# Patient Record
Sex: FEMALE | Race: BLACK OR AFRICAN AMERICAN | NOT HISPANIC OR LATINO | URBAN - METROPOLITAN AREA
[De-identification: names, ages, dates, MRNs, and addresses within clinical notes are randomized per-mention and may not be internally consistent; named-entity substitution may affect disease eponyms.]

---

## 2017-06-17 ENCOUNTER — EMERGENCY (EMERGENCY)
Facility: HOSPITAL | Age: 57
LOS: 1 days | Discharge: HOME | End: 2017-06-17
Attending: EMERGENCY MEDICINE | Admitting: EMERGENCY MEDICINE
Payer: MEDICARE

## 2017-06-17 VITALS
DIASTOLIC BLOOD PRESSURE: 91 MMHG | TEMPERATURE: 98 F | RESPIRATION RATE: 17 BRPM | HEART RATE: 61 BPM | SYSTOLIC BLOOD PRESSURE: 130 MMHG | OXYGEN SATURATION: 100 %

## 2017-06-17 VITALS
DIASTOLIC BLOOD PRESSURE: 81 MMHG | RESPIRATION RATE: 19 BRPM | HEART RATE: 71 BPM | TEMPERATURE: 98 F | SYSTOLIC BLOOD PRESSURE: 153 MMHG | OXYGEN SATURATION: 100 %

## 2017-06-17 DIAGNOSIS — Z98.89 OTHER SPECIFIED POSTPROCEDURAL STATES: Chronic | ICD-10-CM

## 2017-06-17 DIAGNOSIS — Z90.710 ACQUIRED ABSENCE OF BOTH CERVIX AND UTERUS: Chronic | ICD-10-CM

## 2017-06-17 DIAGNOSIS — N20.1 CALCULUS OF URETER: Chronic | ICD-10-CM

## 2017-06-17 DIAGNOSIS — Z98.51 TUBAL LIGATION STATUS: Chronic | ICD-10-CM

## 2017-06-17 DIAGNOSIS — E65 LOCALIZED ADIPOSITY: Chronic | ICD-10-CM

## 2017-06-17 LAB
ALBUMIN SERPL ELPH-MCNC: 4.4 G/DL — SIGNIFICANT CHANGE UP (ref 3.3–5)
ALP SERPL-CCNC: 91 U/L — SIGNIFICANT CHANGE UP (ref 40–120)
ALT FLD-CCNC: 14 U/L — SIGNIFICANT CHANGE UP (ref 4–33)
APPEARANCE UR: SIGNIFICANT CHANGE UP
AST SERPL-CCNC: 15 U/L — SIGNIFICANT CHANGE UP (ref 4–32)
BASE EXCESS BLDV CALC-SCNC: -1.1 MMOL/L — SIGNIFICANT CHANGE UP
BASOPHILS # BLD AUTO: 0.01 K/UL — SIGNIFICANT CHANGE UP (ref 0–0.2)
BASOPHILS NFR BLD AUTO: 0.2 % — SIGNIFICANT CHANGE UP (ref 0–2)
BILIRUB SERPL-MCNC: 0.6 MG/DL — SIGNIFICANT CHANGE UP (ref 0.2–1.2)
BILIRUB UR-MCNC: NEGATIVE — SIGNIFICANT CHANGE UP
BLOOD GAS VENOUS - CREATININE: 1.01 MG/DL — SIGNIFICANT CHANGE UP (ref 0.5–1.3)
BLOOD UR QL VISUAL: HIGH
BUN SERPL-MCNC: 15 MG/DL — SIGNIFICANT CHANGE UP (ref 7–23)
CALCIUM SERPL-MCNC: 8.7 MG/DL — SIGNIFICANT CHANGE UP (ref 8.4–10.5)
CHLORIDE BLDV-SCNC: 114 MMOL/L — HIGH (ref 96–108)
CHLORIDE SERPL-SCNC: 105 MMOL/L — SIGNIFICANT CHANGE UP (ref 98–107)
CO2 SERPL-SCNC: 21 MMOL/L — LOW (ref 22–31)
COLOR SPEC: SIGNIFICANT CHANGE UP
CREAT SERPL-MCNC: 1 MG/DL — SIGNIFICANT CHANGE UP (ref 0.5–1.3)
EOSINOPHIL # BLD AUTO: 0.05 K/UL — SIGNIFICANT CHANGE UP (ref 0–0.5)
EOSINOPHIL NFR BLD AUTO: 0.8 % — SIGNIFICANT CHANGE UP (ref 0–6)
GAS PNL BLDV: 138 MMOL/L — SIGNIFICANT CHANGE UP (ref 136–146)
GLUCOSE BLDV-MCNC: 87 — SIGNIFICANT CHANGE UP (ref 70–99)
GLUCOSE SERPL-MCNC: 86 MG/DL — SIGNIFICANT CHANGE UP (ref 70–99)
GLUCOSE UR-MCNC: NEGATIVE — SIGNIFICANT CHANGE UP
HCO3 BLDV-SCNC: 22 MMOL/L — SIGNIFICANT CHANGE UP (ref 20–27)
HCT VFR BLD CALC: 39.3 % — SIGNIFICANT CHANGE UP (ref 34.5–45)
HCT VFR BLDV CALC: 50 % — HIGH (ref 34.5–45)
HGB BLD-MCNC: 12.5 G/DL — SIGNIFICANT CHANGE UP (ref 11.5–15.5)
HGB BLDV-MCNC: 16.3 G/DL — HIGH (ref 11.5–15.5)
IMM GRANULOCYTES NFR BLD AUTO: 0.3 % — SIGNIFICANT CHANGE UP (ref 0–1.5)
KETONES UR-MCNC: NEGATIVE — SIGNIFICANT CHANGE UP
LACTATE BLDV-MCNC: 2 MMOL/L — SIGNIFICANT CHANGE UP (ref 0.5–2)
LEUKOCYTE ESTERASE UR-ACNC: NEGATIVE — SIGNIFICANT CHANGE UP
LIDOCAIN IGE QN: 27.9 U/L — SIGNIFICANT CHANGE UP (ref 7–60)
LYMPHOCYTES # BLD AUTO: 2.65 K/UL — SIGNIFICANT CHANGE UP (ref 1–3.3)
LYMPHOCYTES # BLD AUTO: 40.6 % — SIGNIFICANT CHANGE UP (ref 13–44)
MCHC RBC-ENTMCNC: 28.1 PG — SIGNIFICANT CHANGE UP (ref 27–34)
MCHC RBC-ENTMCNC: 31.8 % — LOW (ref 32–36)
MCV RBC AUTO: 88.3 FL — SIGNIFICANT CHANGE UP (ref 80–100)
MONOCYTES # BLD AUTO: 0.61 K/UL — SIGNIFICANT CHANGE UP (ref 0–0.9)
MONOCYTES NFR BLD AUTO: 9.4 % — SIGNIFICANT CHANGE UP (ref 2–14)
MUCOUS THREADS # UR AUTO: SIGNIFICANT CHANGE UP
NEUTROPHILS # BLD AUTO: 3.18 K/UL — SIGNIFICANT CHANGE UP (ref 1.8–7.4)
NEUTROPHILS NFR BLD AUTO: 48.7 % — SIGNIFICANT CHANGE UP (ref 43–77)
NITRITE UR-MCNC: NEGATIVE — SIGNIFICANT CHANGE UP
PCO2 BLDV: 44 MMHG — SIGNIFICANT CHANGE UP (ref 41–51)
PH BLDV: 7.35 PH — SIGNIFICANT CHANGE UP (ref 7.32–7.43)
PH UR: 6 — SIGNIFICANT CHANGE UP (ref 4.6–8)
PLATELET # BLD AUTO: 290 K/UL — SIGNIFICANT CHANGE UP (ref 150–400)
PMV BLD: 11.4 FL — SIGNIFICANT CHANGE UP (ref 7–13)
PO2 BLDV: 28 MMHG — LOW (ref 35–40)
POTASSIUM BLDV-SCNC: 3.7 MMOL/L — SIGNIFICANT CHANGE UP (ref 3.4–4.5)
POTASSIUM SERPL-MCNC: 4 MMOL/L — SIGNIFICANT CHANGE UP (ref 3.5–5.3)
POTASSIUM SERPL-SCNC: 4 MMOL/L — SIGNIFICANT CHANGE UP (ref 3.5–5.3)
PROT SERPL-MCNC: 7.5 G/DL — SIGNIFICANT CHANGE UP (ref 6–8.3)
PROT UR-MCNC: 100 — HIGH
RBC # BLD: 4.45 M/UL — SIGNIFICANT CHANGE UP (ref 3.8–5.2)
RBC # FLD: 13.6 % — SIGNIFICANT CHANGE UP (ref 10.3–14.5)
RBC CASTS # UR COMP ASSIST: >50 — HIGH (ref 0–?)
SAO2 % BLDV: 44.6 % — LOW (ref 60–85)
SODIUM SERPL-SCNC: 142 MMOL/L — SIGNIFICANT CHANGE UP (ref 135–145)
SP GR SPEC: 1.02 — SIGNIFICANT CHANGE UP (ref 1–1.03)
SQUAMOUS # UR AUTO: SIGNIFICANT CHANGE UP
UROBILINOGEN FLD QL: NORMAL E.U. — SIGNIFICANT CHANGE UP (ref 0.1–0.2)
WBC # BLD: 6.52 K/UL — SIGNIFICANT CHANGE UP (ref 3.8–10.5)
WBC # FLD AUTO: 6.52 K/UL — SIGNIFICANT CHANGE UP (ref 3.8–10.5)
WBC UR QL: SIGNIFICANT CHANGE UP (ref 0–?)

## 2017-06-17 PROCEDURE — 74176 CT ABD & PELVIS W/O CONTRAST: CPT | Mod: 26

## 2017-06-17 PROCEDURE — 99284 EMERGENCY DEPT VISIT MOD MDM: CPT | Mod: GC

## 2017-06-17 PROCEDURE — 76770 US EXAM ABDO BACK WALL COMP: CPT | Mod: 26

## 2017-06-17 RX ORDER — TRAMADOL HYDROCHLORIDE 50 MG/1
50 TABLET ORAL ONCE
Qty: 0 | Refills: 0 | Status: DISCONTINUED | OUTPATIENT
Start: 2017-06-17 | End: 2017-06-17

## 2017-06-17 RX ORDER — TRAMADOL HYDROCHLORIDE 50 MG/1
1 TABLET ORAL
Qty: 5 | Refills: 0 | OUTPATIENT
Start: 2017-06-17

## 2017-06-17 RX ORDER — ACETAMINOPHEN 500 MG
650 TABLET ORAL ONCE
Qty: 0 | Refills: 0 | Status: COMPLETED | OUTPATIENT
Start: 2017-06-17 | End: 2017-06-17

## 2017-06-17 RX ORDER — KETOROLAC TROMETHAMINE 30 MG/ML
30 SYRINGE (ML) INJECTION ONCE
Qty: 0 | Refills: 0 | Status: DISCONTINUED | OUTPATIENT
Start: 2017-06-17 | End: 2017-06-17

## 2017-06-17 RX ORDER — SODIUM CHLORIDE 9 MG/ML
2000 INJECTION INTRAMUSCULAR; INTRAVENOUS; SUBCUTANEOUS ONCE
Qty: 0 | Refills: 0 | Status: COMPLETED | OUTPATIENT
Start: 2017-06-17 | End: 2017-06-17

## 2017-06-17 RX ADMIN — Medication 30 MILLIGRAM(S): at 14:41

## 2017-06-17 RX ADMIN — SODIUM CHLORIDE 1333.33 MILLILITER(S): 9 INJECTION INTRAMUSCULAR; INTRAVENOUS; SUBCUTANEOUS at 14:42

## 2017-06-17 RX ADMIN — TRAMADOL HYDROCHLORIDE 50 MILLIGRAM(S): 50 TABLET ORAL at 21:23

## 2017-06-17 RX ADMIN — Medication 30 MILLIGRAM(S): at 14:50

## 2017-06-17 RX ADMIN — Medication 650 MILLIGRAM(S): at 17:46

## 2017-06-17 NOTE — ED PROVIDER NOTE - PROGRESS NOTE DETAILS
LORA note: 57 y/o F w/ hx renal stone pw s/s consistent w/ recurrent renal stone, will pain control and us/UA. pt feels better, will follow up with urology;

## 2017-06-17 NOTE — ED ADULT NURSE NOTE - CHIEF COMPLAINT QUOTE
Co left flank pain since last night. States pain subsided but started again today. Co nausea, vomiting and loose stools. Hx of lithotripsy.

## 2017-06-17 NOTE — ED PROVIDER NOTE - PMH
Calculus of ureter    GERD (gastroesophageal reflux disease)    Horseshoe kidney  left  Hypertension    MVP (mitral valve prolapse)    Obesity (BMI 30-39.9)    Psoriatic arthritis    Delacruz syndrome    Sleep apnea, obstructive  not on CPAP

## 2017-06-17 NOTE — ED PROVIDER NOTE - ATTENDING CONTRIBUTION TO CARE
Attending Attestation: Dr. Ramirez  I have personally performed a history and physical examination of the patient and discussed management with the resident as well as the patient.  I reviewed the resident's note and agree with the documented findings and plan of care.  I have authored and modified critical sections of the Provider Note, including but not limited to HPI, Physical Exam and MDM. 57 y/o female hx nephrolithiasis c/o left flank pain, consider renal colic.  Will US, pain control and reassess.

## 2017-06-17 NOTE — ED PROVIDER NOTE - PSH
Abdominal obesity  s/p gastric sleeve 2011  Calculus of left ureter  s/p stent  History of arthroscopy of left knee    S/P hysterectomy  2014  S/P tubal ligation    Status post medial meniscus repair  left knee

## 2017-06-17 NOTE — ED PROVIDER NOTE - MEDICAL DECISION MAKING DETAILS
57 y/o femlae hx nephrolithiasis c/o left flank pain  -probable renal colic  -pain control/us renal 57 y/o female hx nephrolithiasis c/o left flank pain, consider renal colic.  Will US, pain control and reassess.

## 2017-06-17 NOTE — ED PROVIDER NOTE - OBJECTIVE STATEMENT
55 y/o female hx HTN RA Psoriatic arthritis, kidney stones (s/p renal stent and removal 2015) presents to ED c/o left flank pain x 2 days. Pt. states last night developed 5/10 left groind/flank pain  that was intermittent in nature and eventually resolved after motrin - states this am developed worsneing 9/10 left sided groin pain radiating to flank and lower back that is similar to previoous renal colic in the past. aslo admits to urinary urgerncy and slight dysuria as well. + nausea last nigth no vomit. gaurav fever chills weakenss dizziness blood in urine. 55 y/o female hx HTN RA Psoriatic arthritis, kidney stones (s/p renal stent and removal 2015) presents to ED c/o left flank pain x 2 days. Pt. states last night developed 5/10 left groin/flank pain  that was intermittent in nature and eventually resolved after motrin - states this am developed worsening 9/10 left sided groin pain radiating to flank and lower back that is similar to previous renal colic in the past. also admits to urinary urgency and slight dysuria as well. + nausea last nigth no vomit. gaurav fever chills weakenss dizziness blood in urine.

## 2017-06-17 NOTE — ED ADULT NURSE NOTE - OBJECTIVE STATEMENT
Alert and oriented x 4. Pt received to spot 6 complaining of left flank pain 6/10. Pt states " Pain started last night , I had kidney stones before. " Pt denies chest pain, shortness of breath, diarrhea or painful urination. Pt is nauseous and did vomit last night. Iv 20g to left AC> Labs drawn.  VSS. Will continue to monitor.

## 2017-06-19 LAB
BACTERIA UR CULT: SIGNIFICANT CHANGE UP
SPECIMEN SOURCE: SIGNIFICANT CHANGE UP

## 2018-06-01 ENCOUNTER — OUTPATIENT (OUTPATIENT)
Dept: OUTPATIENT SERVICES | Facility: HOSPITAL | Age: 58
LOS: 1 days | End: 2018-06-01
Payer: MEDICAID

## 2018-06-01 DIAGNOSIS — N20.1 CALCULUS OF URETER: Chronic | ICD-10-CM

## 2018-06-01 DIAGNOSIS — Z98.51 TUBAL LIGATION STATUS: Chronic | ICD-10-CM

## 2018-06-01 DIAGNOSIS — E65 LOCALIZED ADIPOSITY: Chronic | ICD-10-CM

## 2018-06-01 DIAGNOSIS — Z98.89 OTHER SPECIFIED POSTPROCEDURAL STATES: Chronic | ICD-10-CM

## 2018-06-01 DIAGNOSIS — Z90.710 ACQUIRED ABSENCE OF BOTH CERVIX AND UTERUS: Chronic | ICD-10-CM

## 2018-06-01 PROCEDURE — G9005: CPT

## 2018-06-15 ENCOUNTER — EMERGENCY (EMERGENCY)
Facility: HOSPITAL | Age: 58
LOS: 1 days | Discharge: HOME | End: 2018-06-15
Attending: EMERGENCY MEDICINE | Admitting: EMERGENCY MEDICINE
Payer: MEDICARE

## 2018-06-15 VITALS
OXYGEN SATURATION: 100 % | RESPIRATION RATE: 14 BRPM | DIASTOLIC BLOOD PRESSURE: 78 MMHG | SYSTOLIC BLOOD PRESSURE: 114 MMHG | HEART RATE: 67 BPM | TEMPERATURE: 98 F

## 2018-06-15 DIAGNOSIS — Z98.89 OTHER SPECIFIED POSTPROCEDURAL STATES: Chronic | ICD-10-CM

## 2018-06-15 DIAGNOSIS — E65 LOCALIZED ADIPOSITY: Chronic | ICD-10-CM

## 2018-06-15 DIAGNOSIS — Z90.710 ACQUIRED ABSENCE OF BOTH CERVIX AND UTERUS: Chronic | ICD-10-CM

## 2018-06-15 DIAGNOSIS — N20.1 CALCULUS OF URETER: Chronic | ICD-10-CM

## 2018-06-15 DIAGNOSIS — Z98.51 TUBAL LIGATION STATUS: Chronic | ICD-10-CM

## 2018-06-15 LAB
ALBUMIN SERPL ELPH-MCNC: 4.5 G/DL — SIGNIFICANT CHANGE UP (ref 3.3–5)
ALP SERPL-CCNC: 93 U/L — SIGNIFICANT CHANGE UP (ref 40–120)
ALT FLD-CCNC: 15 U/L — SIGNIFICANT CHANGE UP (ref 4–33)
AST SERPL-CCNC: 17 U/L — SIGNIFICANT CHANGE UP (ref 4–32)
BASE EXCESS BLDV CALC-SCNC: 0.6 MMOL/L — SIGNIFICANT CHANGE UP
BASOPHILS # BLD AUTO: 0.02 K/UL — SIGNIFICANT CHANGE UP (ref 0–0.2)
BASOPHILS NFR BLD AUTO: 0.3 % — SIGNIFICANT CHANGE UP (ref 0–2)
BILIRUB SERPL-MCNC: 0.7 MG/DL — SIGNIFICANT CHANGE UP (ref 0.2–1.2)
BLOOD GAS VENOUS - CREATININE: 0.8 MG/DL — SIGNIFICANT CHANGE UP (ref 0.5–1.3)
BUN SERPL-MCNC: 16 MG/DL — SIGNIFICANT CHANGE UP (ref 7–23)
CALCIUM SERPL-MCNC: 9.4 MG/DL — SIGNIFICANT CHANGE UP (ref 8.4–10.5)
CHLORIDE BLDV-SCNC: 113 MMOL/L — HIGH (ref 96–108)
CHLORIDE SERPL-SCNC: 103 MMOL/L — SIGNIFICANT CHANGE UP (ref 98–107)
CO2 SERPL-SCNC: 25 MMOL/L — SIGNIFICANT CHANGE UP (ref 22–31)
CREAT SERPL-MCNC: 0.9 MG/DL — SIGNIFICANT CHANGE UP (ref 0.5–1.3)
EOSINOPHIL # BLD AUTO: 0.13 K/UL — SIGNIFICANT CHANGE UP (ref 0–0.5)
EOSINOPHIL NFR BLD AUTO: 2.1 % — SIGNIFICANT CHANGE UP (ref 0–6)
GAS PNL BLDV: 137 MMOL/L — SIGNIFICANT CHANGE UP (ref 136–146)
GLUCOSE BLDV-MCNC: 86 — SIGNIFICANT CHANGE UP (ref 70–99)
GLUCOSE SERPL-MCNC: 84 MG/DL — SIGNIFICANT CHANGE UP (ref 70–99)
HCO3 BLDV-SCNC: 23 MMOL/L — SIGNIFICANT CHANGE UP (ref 20–27)
HCT VFR BLD CALC: 40.1 % — SIGNIFICANT CHANGE UP (ref 34.5–45)
HCT VFR BLDV CALC: 41.9 % — SIGNIFICANT CHANGE UP (ref 34.5–45)
HGB BLD-MCNC: 12.8 G/DL — SIGNIFICANT CHANGE UP (ref 11.5–15.5)
HGB BLDV-MCNC: 13.6 G/DL — SIGNIFICANT CHANGE UP (ref 11.5–15.5)
IMM GRANULOCYTES # BLD AUTO: 0.02 # — SIGNIFICANT CHANGE UP
IMM GRANULOCYTES NFR BLD AUTO: 0.3 % — SIGNIFICANT CHANGE UP (ref 0–1.5)
LACTATE BLDV-MCNC: 1.4 MMOL/L — SIGNIFICANT CHANGE UP (ref 0.5–2)
LYMPHOCYTES # BLD AUTO: 2.49 K/UL — SIGNIFICANT CHANGE UP (ref 1–3.3)
LYMPHOCYTES # BLD AUTO: 40.2 % — SIGNIFICANT CHANGE UP (ref 13–44)
MCHC RBC-ENTMCNC: 27.7 PG — SIGNIFICANT CHANGE UP (ref 27–34)
MCHC RBC-ENTMCNC: 31.9 % — LOW (ref 32–36)
MCV RBC AUTO: 86.8 FL — SIGNIFICANT CHANGE UP (ref 80–100)
MONOCYTES # BLD AUTO: 0.81 K/UL — SIGNIFICANT CHANGE UP (ref 0–0.9)
MONOCYTES NFR BLD AUTO: 13.1 % — SIGNIFICANT CHANGE UP (ref 2–14)
NEUTROPHILS # BLD AUTO: 2.72 K/UL — SIGNIFICANT CHANGE UP (ref 1.8–7.4)
NEUTROPHILS NFR BLD AUTO: 44 % — SIGNIFICANT CHANGE UP (ref 43–77)
NRBC # FLD: 0 — SIGNIFICANT CHANGE UP
PCO2 BLDV: 53 MMHG — HIGH (ref 41–51)
PH BLDV: 7.31 PH — LOW (ref 7.32–7.43)
PLATELET # BLD AUTO: 299 K/UL — SIGNIFICANT CHANGE UP (ref 150–400)
PMV BLD: 10.7 FL — SIGNIFICANT CHANGE UP (ref 7–13)
PO2 BLDV: < 24 MMHG — LOW (ref 35–40)
POTASSIUM BLDV-SCNC: 4.2 MMOL/L — SIGNIFICANT CHANGE UP (ref 3.4–4.5)
POTASSIUM SERPL-MCNC: 4.4 MMOL/L — SIGNIFICANT CHANGE UP (ref 3.5–5.3)
POTASSIUM SERPL-SCNC: 4.4 MMOL/L — SIGNIFICANT CHANGE UP (ref 3.5–5.3)
PROT SERPL-MCNC: 7.8 G/DL — SIGNIFICANT CHANGE UP (ref 6–8.3)
RBC # BLD: 4.62 M/UL — SIGNIFICANT CHANGE UP (ref 3.8–5.2)
RBC # FLD: 13.8 % — SIGNIFICANT CHANGE UP (ref 10.3–14.5)
SAO2 % BLDV: 29.5 % — LOW (ref 60–85)
SODIUM SERPL-SCNC: 141 MMOL/L — SIGNIFICANT CHANGE UP (ref 135–145)
WBC # BLD: 6.19 K/UL — SIGNIFICANT CHANGE UP (ref 3.8–10.5)
WBC # FLD AUTO: 6.19 K/UL — SIGNIFICANT CHANGE UP (ref 3.8–10.5)

## 2018-06-15 PROCEDURE — 99219: CPT

## 2018-06-15 RX ORDER — DIPHENHYDRAMINE HCL 50 MG
25 CAPSULE ORAL ONCE
Qty: 0 | Refills: 0 | Status: COMPLETED | OUTPATIENT
Start: 2018-06-15 | End: 2018-06-15

## 2018-06-15 RX ORDER — IBUPROFEN 200 MG
600 TABLET ORAL EVERY 6 HOURS
Qty: 0 | Refills: 0 | Status: DISCONTINUED | OUTPATIENT
Start: 2018-06-15 | End: 2018-06-19

## 2018-06-15 RX ORDER — IBUPROFEN 200 MG
600 TABLET ORAL ONCE
Qty: 0 | Refills: 0 | Status: COMPLETED | OUTPATIENT
Start: 2018-06-15 | End: 2018-06-15

## 2018-06-15 RX ORDER — ACETAMINOPHEN 500 MG
650 TABLET ORAL ONCE
Qty: 0 | Refills: 0 | Status: COMPLETED | OUTPATIENT
Start: 2018-06-15 | End: 2018-06-15

## 2018-06-15 RX ADMIN — Medication 100 MILLIGRAM(S): at 08:38

## 2018-06-15 RX ADMIN — Medication 25 MILLIGRAM(S): at 18:00

## 2018-06-15 RX ADMIN — Medication 600 MILLIGRAM(S): at 18:30

## 2018-06-15 RX ADMIN — Medication 650 MILLIGRAM(S): at 12:02

## 2018-06-15 RX ADMIN — Medication 650 MILLIGRAM(S): at 13:02

## 2018-06-15 RX ADMIN — Medication 100 MILLIGRAM(S): at 22:02

## 2018-06-15 RX ADMIN — Medication 600 MILLIGRAM(S): at 18:00

## 2018-06-15 RX ADMIN — Medication 100 MILLIGRAM(S): at 14:21

## 2018-06-15 NOTE — ED CDU PROVIDER INITIAL DAY NOTE - MEDICAL DECISION MAKING DETAILS
57 y.o female pmhx of ?eczema vs psoriatic arthritis here with R sided facial cellulitis- IV abx, area demarcated will continue to monitor.

## 2018-06-15 NOTE — ED PROVIDER NOTE - OBJECTIVE STATEMENT
58 yo woman w/ h/o asthma, psoriatic arthritis (no immune modulators) here w/ facial swelling. Developed redness and swelling of R face/cheek several days ago after sustaining a small scratch to area. States this is different that normal lesions she has w/ psoriatic arthritis. Otherwise, denies fever, visual changes, eye pain, dental pain.

## 2018-06-15 NOTE — ED PROVIDER NOTE - MEDICAL DECISION MAKING DETAILS
58 yo woman w/ redness, swelling, and crusting of R face. Likely cellulitis. The plan is to check labs, antibiotics.

## 2018-06-15 NOTE — ED ADULT NURSE NOTE - OBJECTIVE STATEMENT
Pt presented to ED with c/o pain and swelling to right face x 5 days, crusting noted to area. pt also reported pain to joints. denies trauma to face.

## 2018-06-15 NOTE — ED ADULT TRIAGE NOTE - CHIEF COMPLAINT QUOTE
Pt with h/o psoriatic arthritis c/o right facial lesion x 5 days. States it is improving, but presents to ED for eval because it is unlike usual lesions she has had in the past. Right side of face is red and swollen. Also c/o joint pain. PMH Raynaud's Syndrome, migraines, kidney stones with urethral stent, MVP, asthma

## 2018-06-15 NOTE — ED PROVIDER NOTE - PROGRESS NOTE DETAILS
Spencer Amador MD PGY4: Labs reviewed. Received ABX in ED. Case d/w CDU and accepted for observation.

## 2018-06-15 NOTE — ED CDU PROVIDER INITIAL DAY NOTE - PROGRESS NOTE DETAILS
This patient was signed out to me by CDU day EDITH Contreras and attending Dr. Eldridge at 1900 hrs.; test results and orders reviewed.  In the interim, pt has been resting comfortably; no c/o or issues thus far.  Pt. stable at present.  Will continue to monitor.

## 2018-06-15 NOTE — ED CDU PROVIDER INITIAL DAY NOTE - PHYSICAL EXAMINATION
Scattered old scarred lesions on R forearm  R eyelid swelling  EOM intact, no conjunctival injection  PERRL  R side facial erythema over R cheek ( area demarcated)   center crusted lesion ~ 2 x 2 inch with no active drainage  preauricular node swelling to R side   no neck swelling or tenderness  uvula midline, tolerating airway Scattered old scarred lesions on R forearm  R eyelid swelling  EOM intact, no conjunctival injection  PERRL  R side facial erythema over R cheek ( area demarcated)   center crusted lesion ~ 2 x 2 cm with no active drainage  preauricular node swelling to R side   no neck swelling or tenderness  uvula midline, tolerating airway

## 2018-06-15 NOTE — ED CDU PROVIDER INITIAL DAY NOTE - FAMILY HISTORY
Mother  Still living? No  Family history of diabetes mellitus in mother, Age at diagnosis: Age Unknown     Father  Still living? No  Family history of hypertension in father, Age at diagnosis: Age Unknown

## 2018-06-15 NOTE — ED CDU PROVIDER INITIAL DAY NOTE - ATTENDING CONTRIBUTION TO CARE
CDU MD Post:  I performed a face to face bedside interview with patient regarding history of present illness, review of symptoms and past medical history. I completed an independent physical exam.  I have discussed patient's plan of care with PA.   I agree with note as stated above, having amended the EMR as needed to reflect my findings. I have discussed the assessment and plan of care.  This includes during the time I functioned as the attending physician for this patient.   Gen: Alert, NAD  Head:  EOMI, R eyelid edema; R facial edema/erythema over R cheek with crusted lesion, not actively draining  ENT:  normal hearing, patent oropharynx without erythema/exudate  Neck: +supple, no tenderness/meningismus/JVD, +Trachea midline  Chest: no chest wall tenderness, equal chest rise  Pulm: Bilateral BS, normal resp effort, no wheeze/stridor/retractions  CV: RRR, no M/R/G, +dist pulses  Abd: +BS, soft, NT/ND  Rectal: deferred  Mskel: no edema/erythema/cyanosis  Skin: scattered cirucular lesions on b/l arms w/ skin discoloration(darkening)  Neuro: AAOx3, no sensory/motor deficits, CN 2-12 intact

## 2018-06-15 NOTE — ED PROVIDER NOTE - ATTENDING CONTRIBUTION TO CARE
57F p/w R face swelling x 3-4 days, started after a small scratch to the face, different from previous psoriatic arthritis lesions.  Pt has psoriatic arthritis, but doesn't take any DMARDS for concern of infection and lost her rheum to a insurance change.  Pt denies EOM pain or diplopia, no ear pain, denies fever, no teeth pain, no nasal discharge, no HA or vomiting or LOC, no weakness of arms or legs.  Pt with R facial cellulitis, plan to check labs and rx clinda, CDU for obs, would recc 24 hrs of IV abx and release on PO Abx if better or at least not worse.  Notes some radiation of pain towards R ear and down R neck.  Pt interested in seeing rheumatologist but noone will start DMARDS in setting of acute infection; can follow up as outpt.   VS:  unremarkable    GEN - NAD; malaise; A+O x3   HEAD - NC/AT     ENT - PEERL, EOMI, mucous membranes  moist , no discharge    Except R face abrasion/scratch 3cm below R pupil, no pus discharge, with surrounding erythema, warmth, tenderness extending to R lower eyelid, sparing nose.  R periauricular LAD and R neck mild LAD.  No meningismus  NECK: Neck supple, non-tender without lymphadenopathy, no masses, no JVD  PULM - CTA b/l,  symmetric breath sounds  COR -  normal heart sounds    ABD - , ND, NT, soft, no guarding, no rebound, no masses    BACK - no CVA tenderness, nontender spine     EXTREMS - no edema, no deformity, warm and well perfused    SKIN - no rash or bruising      NEUROLOGIC - alert, CN 2-12 intact, sensation nl, motor 5/5 RUE/LUE/RLE/LLE.

## 2018-06-16 VITALS
TEMPERATURE: 98 F | HEART RATE: 56 BPM | RESPIRATION RATE: 17 BRPM | DIASTOLIC BLOOD PRESSURE: 72 MMHG | OXYGEN SATURATION: 95 % | SYSTOLIC BLOOD PRESSURE: 134 MMHG

## 2018-06-16 PROCEDURE — 99217: CPT

## 2018-06-16 RX ORDER — DIPHENHYDRAMINE HCL 50 MG
50 CAPSULE ORAL EVERY 6 HOURS
Qty: 0 | Refills: 0 | Status: DISCONTINUED | OUTPATIENT
Start: 2018-06-16 | End: 2018-06-19

## 2018-06-16 RX ADMIN — Medication 50 MILLIGRAM(S): at 14:35

## 2018-06-16 RX ADMIN — Medication 100 MILLIGRAM(S): at 14:06

## 2018-06-16 RX ADMIN — Medication 50 MILLIGRAM(S): at 03:00

## 2018-06-16 RX ADMIN — Medication 100 MILLIGRAM(S): at 05:45

## 2018-06-16 NOTE — ED CDU PROVIDER SUBSEQUENT DAY NOTE - PROGRESS NOTE DETAILS
Pt resting comfortably, NAD.  VSS. Cellulitis with slight improvement. Will cont IV AB, benadryl prn and re-eval Facial cellulitis improving. Afebrile. Will receive one more dose IV Clinda now and then will DC with PMD follow up and PO Clinda

## 2018-06-16 NOTE — ED CDU PROVIDER SUBSEQUENT DAY NOTE - ENMT, MLM
Airway patent. Nasal mucosa clear. Mouth with moist mucosa. Neck supple.  +right cheek localized erythema with increased warmth and mild soft tissue swelling to same.  Area of induration/superficial macular xerotic lesion noted to mid cheek region superiorly, approx. 1.5 cm in diameter, with no vessicle or discharge to same.  Area of erythema was previously demarcated with surgical pen; there is no extension of erythema outside this area of demarcation.

## 2018-06-16 NOTE — ED CDU PROVIDER SUBSEQUENT DAY NOTE - ATTENDING CONTRIBUTION TO CARE
Dr. Hirsch:  I performed a face to face bedside interview with patient regarding history of present illness, review of symptoms and past medical history. I completed an independent physical exam.  I have discussed patient's plan of care with PA.   I agree with note as stated above, having amended the EMR as needed to reflect my findings.   This includes HISTORY OF PRESENT ILLNESS, HIV, PAST MEDICAL/SURGICAL/FAMILY/SOCIAL HISTORY, ALLERGIES AND HOME MEDICATIONS, REVIEW OF SYSTEMS, PHYSICAL EXAM, and any PROGRESS NOTES during the time I functioned as the attending physician for this patient.    57F h/o psoriatic arthritis presents with infection to right cheek.  Sent to CDU for further abx.  Pt states she feels improved.    Exam:  - nad  - +large area of erythema, edema and dry skin to right cheek with central scab  - rrr  - ctab    A/P  - cellulitis to cheek  - continue abx

## 2018-06-16 NOTE — ED CDU PROVIDER DISPOSITION NOTE - ATTENDING CONTRIBUTION TO CARE
Dr. Hirsch:  I performed a face to face bedside interview with patient regarding history of present illness, review of symptoms and past medical history. I completed an independent physical exam.  I have discussed patient's plan of care with PA.   I agree with note as stated above, having amended the EMR as needed to reflect my findings.   This includes HISTORY OF PRESENT ILLNESS, HIV, PAST MEDICAL/SURGICAL/FAMILY/SOCIAL HISTORY, ALLERGIES AND HOME MEDICATIONS, REVIEW OF SYSTEMS, PHYSICAL EXAM, and any PROGRESS NOTES during the time I functioned as the attending physician for this patient.

## 2018-06-16 NOTE — ED CDU PROVIDER SUBSEQUENT DAY NOTE - HISTORY
CDU Initial Note HPI: "58 yo woman pmhx of ? eczema vs psoriatic arthritis (no immune modulators has had extensive work ups and hasn't been told what she has ) here with R sided facial itching and swelling since monday. Patient developed her typical looking lesion on her R cheek and then developed itching and drainage from ther site ( the typical progression of her lesions). Today came in for continued swelling and pain.  Denies fevers, chills, blurry vision, headache n/v/. In the ED normal wbc, afebrile. Sent to CDU for IV Abx."  In the interim, pt receiving IV clinda per orders; pt has been objectively noted to be resting comfortably; no new/worsening symptoms.

## 2018-06-16 NOTE — ED CDU PROVIDER DISPOSITION NOTE - PLAN OF CARE
Follow up with a primary care doctor within 48-72 hours.  You can call: [facility] Find a Physician helpline ([phone]) for assistance. Take copies of your reports given to you.  Take all of your medications as previously prescribed. Take Clindamycin 300mg 1 tab 4x/day for 6 more days. Clean face gently with soap and water daily. Apply bacitracin where scab fell off and cover. Worsening, continued or ANY new concerning symptoms return to the emergency department.

## 2018-06-16 NOTE — ED CDU PROVIDER DISPOSITION NOTE - CLINICAL COURSE
[place]:  57F h/o psoriatic arthritis presents with cellulitis to right cheek.  Sent to CDU for IV abx, clinically improved, discharge home with antibiotics and return precautions.

## 2018-06-18 DIAGNOSIS — R69 ILLNESS, UNSPECIFIED: ICD-10-CM

## 2018-07-01 ENCOUNTER — OUTPATIENT (OUTPATIENT)
Dept: OUTPATIENT SERVICES | Facility: HOSPITAL | Age: 58
LOS: 1 days | End: 2018-07-01
Payer: MEDICARE

## 2018-07-01 DIAGNOSIS — N20.1 CALCULUS OF URETER: Chronic | ICD-10-CM

## 2018-07-01 DIAGNOSIS — Z98.51 TUBAL LIGATION STATUS: Chronic | ICD-10-CM

## 2018-07-01 DIAGNOSIS — Z90.710 ACQUIRED ABSENCE OF BOTH CERVIX AND UTERUS: Chronic | ICD-10-CM

## 2018-07-01 DIAGNOSIS — E65 LOCALIZED ADIPOSITY: Chronic | ICD-10-CM

## 2018-07-01 DIAGNOSIS — Z98.89 OTHER SPECIFIED POSTPROCEDURAL STATES: Chronic | ICD-10-CM

## 2018-07-16 DIAGNOSIS — Z71.89 OTHER SPECIFIED COUNSELING: ICD-10-CM

## 2018-08-23 ENCOUNTER — EMERGENCY (EMERGENCY)
Facility: HOSPITAL | Age: 58
LOS: 1 days | Discharge: HOME | End: 2018-08-23
Attending: EMERGENCY MEDICINE | Admitting: EMERGENCY MEDICINE
Payer: MEDICARE

## 2018-08-23 VITALS
OXYGEN SATURATION: 100 % | RESPIRATION RATE: 16 BRPM | HEART RATE: 67 BPM | DIASTOLIC BLOOD PRESSURE: 78 MMHG | SYSTOLIC BLOOD PRESSURE: 124 MMHG | TEMPERATURE: 98 F

## 2018-08-23 DIAGNOSIS — N20.1 CALCULUS OF URETER: Chronic | ICD-10-CM

## 2018-08-23 DIAGNOSIS — Z90.710 ACQUIRED ABSENCE OF BOTH CERVIX AND UTERUS: Chronic | ICD-10-CM

## 2018-08-23 DIAGNOSIS — Z98.89 OTHER SPECIFIED POSTPROCEDURAL STATES: Chronic | ICD-10-CM

## 2018-08-23 DIAGNOSIS — Z98.51 TUBAL LIGATION STATUS: Chronic | ICD-10-CM

## 2018-08-23 DIAGNOSIS — E65 LOCALIZED ADIPOSITY: Chronic | ICD-10-CM

## 2018-08-23 PROCEDURE — 99283 EMERGENCY DEPT VISIT LOW MDM: CPT

## 2018-08-23 RX ORDER — IBUPROFEN 200 MG
1 TABLET ORAL
Qty: 15 | Refills: 0 | OUTPATIENT
Start: 2018-08-23

## 2018-08-23 RX ORDER — OFLOXACIN OTIC SOLUTION 3 MG/ML
5 SOLUTION/ DROPS AURICULAR (OTIC)
Qty: 5 | Refills: 0 | OUTPATIENT
Start: 2018-08-23 | End: 2018-09-05

## 2018-08-23 NOTE — ED PROVIDER NOTE - CHPI ED SYMPTOMS NEG
no blurred vision/no change in level of consciousness/no chills/no fever/no nausea/no weakness/no numbness

## 2018-08-23 NOTE — ED PROVIDER NOTE - ATTENDING CONTRIBUTION TO CARE
I agree with the above H&P.  Briefly this is a 57 year old with left ear pain and drainage. found to have perf'd TM on exam.  will tx with oflox and follow up with ENT.

## 2018-08-23 NOTE — ED PROVIDER NOTE - MEDICAL DECISION MAKING DETAILS
Patient is a 56 yo female, presents c/o left ear pain for 1 week, with associated drainage, muffled hearing, and dizziness. PE consistent with ruptured TM with infection. Plan is for pain control, antibiotic drops ,and ENT follow up.

## 2018-08-23 NOTE — ED PROVIDER NOTE - OBJECTIVE STATEMENT
Patient is a 56 yo female, presents c/o left ear pain for 1 week. States she put a q-tip in her right ear, and shortly after her left ear started to hurt. She went to her PCP who said her ear was full of wax, so she's been using OTC wax drops, but states it burns when she puts it in her ear. She noticed watery drainage from the left ear. She also feels like her hearing is muffled, and she's slightly dizzy. She denies fever, chills, URI symptoms, facial pain/swelling, neck pain.

## 2018-08-23 NOTE — ED ADULT TRIAGE NOTE - CHIEF COMPLAINT QUOTE
Pt c/o something stuck in her left ear for the past 3 days, states she has trouble hearing, noted a clear discharge from the ear and states her equilibrium is off. Denies pain

## 2018-09-30 ENCOUNTER — EMERGENCY (EMERGENCY)
Facility: HOSPITAL | Age: 58
LOS: 1 days | Discharge: HOME | End: 2018-09-30
Attending: EMERGENCY MEDICINE | Admitting: EMERGENCY MEDICINE
Payer: MEDICARE

## 2018-09-30 VITALS
RESPIRATION RATE: 16 BRPM | HEART RATE: 75 BPM | OXYGEN SATURATION: 100 % | SYSTOLIC BLOOD PRESSURE: 128 MMHG | DIASTOLIC BLOOD PRESSURE: 96 MMHG | TEMPERATURE: 99 F

## 2018-09-30 DIAGNOSIS — E65 LOCALIZED ADIPOSITY: Chronic | ICD-10-CM

## 2018-09-30 DIAGNOSIS — Z98.51 TUBAL LIGATION STATUS: Chronic | ICD-10-CM

## 2018-09-30 DIAGNOSIS — Z98.89 OTHER SPECIFIED POSTPROCEDURAL STATES: Chronic | ICD-10-CM

## 2018-09-30 DIAGNOSIS — Z90.710 ACQUIRED ABSENCE OF BOTH CERVIX AND UTERUS: Chronic | ICD-10-CM

## 2018-09-30 DIAGNOSIS — N20.1 CALCULUS OF URETER: Chronic | ICD-10-CM

## 2018-09-30 LAB
ALBUMIN SERPL ELPH-MCNC: 4.9 G/DL — SIGNIFICANT CHANGE UP (ref 3.3–5)
ALP SERPL-CCNC: 82 U/L — SIGNIFICANT CHANGE UP (ref 40–120)
ALT FLD-CCNC: 26 U/L — SIGNIFICANT CHANGE UP (ref 4–33)
AST SERPL-CCNC: 26 U/L — SIGNIFICANT CHANGE UP (ref 4–32)
BASE EXCESS BLDV CALC-SCNC: 3.5 MMOL/L — SIGNIFICANT CHANGE UP
BASOPHILS # BLD AUTO: 0.02 K/UL — SIGNIFICANT CHANGE UP (ref 0–0.2)
BASOPHILS NFR BLD AUTO: 0.4 % — SIGNIFICANT CHANGE UP (ref 0–2)
BASOPHILS NFR SPEC: 1.7 % — SIGNIFICANT CHANGE UP (ref 0–2)
BILIRUB SERPL-MCNC: 0.9 MG/DL — SIGNIFICANT CHANGE UP (ref 0.2–1.2)
BLASTS # FLD: 0 % — SIGNIFICANT CHANGE UP (ref 0–0)
BLD GP AB SCN SERPL QL: NEGATIVE — SIGNIFICANT CHANGE UP
BLOOD GAS VENOUS - CREATININE: 0.85 MG/DL — SIGNIFICANT CHANGE UP (ref 0.5–1.3)
BUN SERPL-MCNC: 13 MG/DL — SIGNIFICANT CHANGE UP (ref 7–23)
CALCIUM SERPL-MCNC: 9.4 MG/DL — SIGNIFICANT CHANGE UP (ref 8.4–10.5)
CHLORIDE BLDV-SCNC: 105 MMOL/L — SIGNIFICANT CHANGE UP (ref 96–108)
CHLORIDE SERPL-SCNC: 100 MMOL/L — SIGNIFICANT CHANGE UP (ref 98–107)
CO2 SERPL-SCNC: 26 MMOL/L — SIGNIFICANT CHANGE UP (ref 22–31)
CREAT SERPL-MCNC: 0.99 MG/DL — SIGNIFICANT CHANGE UP (ref 0.5–1.3)
CRP SERPL-MCNC: < 5 MG/L — SIGNIFICANT CHANGE UP
EOSINOPHIL # BLD AUTO: 0.02 K/UL — SIGNIFICANT CHANGE UP (ref 0–0.5)
EOSINOPHIL NFR BLD AUTO: 0.4 % — SIGNIFICANT CHANGE UP (ref 0–6)
EOSINOPHIL NFR FLD: 0 % — SIGNIFICANT CHANGE UP (ref 0–6)
ERYTHROCYTE [SEDIMENTATION RATE] IN BLOOD: 8 MM/HR — SIGNIFICANT CHANGE UP (ref 4–25)
GAS PNL BLDV: 138 MMOL/L — SIGNIFICANT CHANGE UP (ref 136–146)
GIANT PLATELETS BLD QL SMEAR: PRESENT — SIGNIFICANT CHANGE UP
GLUCOSE BLDV-MCNC: 97 — SIGNIFICANT CHANGE UP (ref 70–99)
GLUCOSE SERPL-MCNC: 96 MG/DL — SIGNIFICANT CHANGE UP (ref 70–99)
HCO3 BLDV-SCNC: 25 MMOL/L — SIGNIFICANT CHANGE UP (ref 20–27)
HCT VFR BLD CALC: 43.1 % — SIGNIFICANT CHANGE UP (ref 34.5–45)
HCT VFR BLDV CALC: 44 % — SIGNIFICANT CHANGE UP (ref 34.5–45)
HGB BLD-MCNC: 13.8 G/DL — SIGNIFICANT CHANGE UP (ref 11.5–15.5)
HGB BLDV-MCNC: 14.4 G/DL — SIGNIFICANT CHANGE UP (ref 11.5–15.5)
IMM GRANULOCYTES # BLD AUTO: 0.02 # — SIGNIFICANT CHANGE UP
IMM GRANULOCYTES NFR BLD AUTO: 0.4 % — SIGNIFICANT CHANGE UP (ref 0–1.5)
LACTATE BLDV-MCNC: 1.5 MMOL/L — SIGNIFICANT CHANGE UP (ref 0.5–2)
LYMPHOCYTES # BLD AUTO: 1.11 K/UL — SIGNIFICANT CHANGE UP (ref 1–3.3)
LYMPHOCYTES # BLD AUTO: 21.8 % — SIGNIFICANT CHANGE UP (ref 13–44)
LYMPHOCYTES NFR SPEC AUTO: 24.6 % — SIGNIFICANT CHANGE UP (ref 13–44)
MAGNESIUM SERPL-MCNC: 2 MG/DL — SIGNIFICANT CHANGE UP (ref 1.6–2.6)
MCHC RBC-ENTMCNC: 28.3 PG — SIGNIFICANT CHANGE UP (ref 27–34)
MCHC RBC-ENTMCNC: 32 % — SIGNIFICANT CHANGE UP (ref 32–36)
MCV RBC AUTO: 88.3 FL — SIGNIFICANT CHANGE UP (ref 80–100)
METAMYELOCYTES # FLD: 0.9 % — SIGNIFICANT CHANGE UP (ref 0–1)
MONOCYTES # BLD AUTO: 0.89 K/UL — SIGNIFICANT CHANGE UP (ref 0–0.9)
MONOCYTES NFR BLD AUTO: 17.5 % — HIGH (ref 2–14)
MONOCYTES NFR BLD: 15.8 % — HIGH (ref 2–9)
MYELOCYTES NFR BLD: 0 % — SIGNIFICANT CHANGE UP (ref 0–0)
NEUTROPHIL AB SER-ACNC: 55.3 % — SIGNIFICANT CHANGE UP (ref 43–77)
NEUTROPHILS # BLD AUTO: 3.03 K/UL — SIGNIFICANT CHANGE UP (ref 1.8–7.4)
NEUTROPHILS NFR BLD AUTO: 59.5 % — SIGNIFICANT CHANGE UP (ref 43–77)
NEUTS BAND # BLD: 0 % — SIGNIFICANT CHANGE UP (ref 0–6)
NRBC # FLD: 0 — SIGNIFICANT CHANGE UP
OB PNL STL: NEGATIVE — SIGNIFICANT CHANGE UP
OTHER - HEMATOLOGY %: 0 — SIGNIFICANT CHANGE UP
PCO2 BLDV: 51 MMHG — SIGNIFICANT CHANGE UP (ref 41–51)
PH BLDV: 7.37 PH — SIGNIFICANT CHANGE UP (ref 7.32–7.43)
PHOSPHATE SERPL-MCNC: 4 MG/DL — SIGNIFICANT CHANGE UP (ref 2.5–4.5)
PLATELET # BLD AUTO: 280 K/UL — SIGNIFICANT CHANGE UP (ref 150–400)
PLATELET COUNT - ESTIMATE: NORMAL — SIGNIFICANT CHANGE UP
PMV BLD: 10.3 FL — SIGNIFICANT CHANGE UP (ref 7–13)
PO2 BLDV: 21 MMHG — LOW (ref 35–40)
POTASSIUM BLDV-SCNC: 4.3 MMOL/L — SIGNIFICANT CHANGE UP (ref 3.4–4.5)
POTASSIUM SERPL-MCNC: 4.3 MMOL/L — SIGNIFICANT CHANGE UP (ref 3.5–5.3)
POTASSIUM SERPL-SCNC: 4.3 MMOL/L — SIGNIFICANT CHANGE UP (ref 3.5–5.3)
PROMYELOCYTES # FLD: 0 % — SIGNIFICANT CHANGE UP (ref 0–0)
PROT SERPL-MCNC: 7.9 G/DL — SIGNIFICANT CHANGE UP (ref 6–8.3)
RBC # BLD: 4.88 M/UL — SIGNIFICANT CHANGE UP (ref 3.8–5.2)
RBC # FLD: 14 % — SIGNIFICANT CHANGE UP (ref 10.3–14.5)
RH IG SCN BLD-IMP: POSITIVE — SIGNIFICANT CHANGE UP
SAO2 % BLDV: 25.5 % — LOW (ref 60–85)
SODIUM SERPL-SCNC: 138 MMOL/L — SIGNIFICANT CHANGE UP (ref 135–145)
STOMATOCYTES BLD QL SMEAR: SLIGHT — SIGNIFICANT CHANGE UP
VARIANT LYMPHS # BLD: 1.7 % — SIGNIFICANT CHANGE UP
WBC # BLD: 5.09 K/UL — SIGNIFICANT CHANGE UP (ref 3.8–10.5)
WBC # FLD AUTO: 5.09 K/UL — SIGNIFICANT CHANGE UP (ref 3.8–10.5)

## 2018-09-30 PROCEDURE — 70481 CT ORBIT/EAR/FOSSA W/DYE: CPT | Mod: 26

## 2018-09-30 PROCEDURE — 71046 X-RAY EXAM CHEST 2 VIEWS: CPT | Mod: 26

## 2018-09-30 PROCEDURE — 99284 EMERGENCY DEPT VISIT MOD MDM: CPT | Mod: GC

## 2018-09-30 RX ORDER — IBUPROFEN 200 MG
600 TABLET ORAL ONCE
Qty: 0 | Refills: 0 | Status: COMPLETED | OUTPATIENT
Start: 2018-09-30 | End: 2018-09-30

## 2018-09-30 RX ORDER — DIAZEPAM 5 MG
1 TABLET ORAL
Qty: 9 | Refills: 0 | OUTPATIENT
Start: 2018-09-30 | End: 2018-10-02

## 2018-09-30 RX ORDER — ACETAMINOPHEN 500 MG
3 TABLET ORAL
Qty: 84 | Refills: 0 | OUTPATIENT
Start: 2018-09-30 | End: 2018-10-06

## 2018-09-30 RX ORDER — LIDOCAINE 4 G/100G
1 CREAM TOPICAL ONCE
Qty: 0 | Refills: 0 | Status: COMPLETED | OUTPATIENT
Start: 2018-09-30 | End: 2018-09-30

## 2018-09-30 RX ORDER — ACETAMINOPHEN 500 MG
975 TABLET ORAL ONCE
Qty: 0 | Refills: 0 | Status: COMPLETED | OUTPATIENT
Start: 2018-09-30 | End: 2018-09-30

## 2018-09-30 RX ORDER — IBUPROFEN 200 MG
1 TABLET ORAL
Qty: 28 | Refills: 0 | OUTPATIENT
Start: 2018-09-30 | End: 2018-10-06

## 2018-09-30 RX ORDER — SODIUM CHLORIDE 9 MG/ML
1000 INJECTION INTRAMUSCULAR; INTRAVENOUS; SUBCUTANEOUS ONCE
Qty: 0 | Refills: 0 | Status: COMPLETED | OUTPATIENT
Start: 2018-09-30 | End: 2018-09-30

## 2018-09-30 RX ADMIN — LIDOCAINE 1 PATCH: 4 CREAM TOPICAL at 12:53

## 2018-09-30 RX ADMIN — Medication 975 MILLIGRAM(S): at 10:48

## 2018-09-30 RX ADMIN — SODIUM CHLORIDE 1333.33 MILLILITER(S): 9 INJECTION INTRAMUSCULAR; INTRAVENOUS; SUBCUTANEOUS at 10:41

## 2018-09-30 RX ADMIN — Medication 600 MILLIGRAM(S): at 12:53

## 2018-09-30 RX ADMIN — Medication 975 MILLIGRAM(S): at 12:53

## 2018-09-30 RX ADMIN — Medication 600 MILLIGRAM(S): at 10:48

## 2018-09-30 NOTE — ED PROVIDER NOTE - CARE PLAN
Principal Discharge DX:	Neck strain  Assessment and plan of treatment:	Take acetaminophen (Tylenol) 975mg (3 regular strength tablets or 2 extra strength tablets) as often as every 6 hours as needed for pain. Never take more than 4000mg of acetaminophen/Tylenol in any 24 hour period. Be cautious of over the counter medications as many formularies contain acetaminophen in them. Take ibuprofen (or Motrin) 600mg (3 tablets) up to 4 times per day as needed for pain with food or milk. Follow up with your primary doctor for repeat evaluation in 3-5 days for repeat evaluation.

## 2018-09-30 NOTE — ED ADULT NURSE NOTE - NSIMPLEMENTINTERV_GEN_ALL_ED
Implemented All Universal Safety Interventions:  [place] to call system. Call bell, personal items and telephone within reach. Instruct patient to call for assistance. Room bathroom lighting operational. Non-slip footwear when patient is off stretcher. Physically safe environment: no spills, clutter or unnecessary equipment. Stretcher in lowest position, wheels locked, appropriate side rails in place.

## 2018-09-30 NOTE — ED PROVIDER NOTE - PHYSICAL EXAMINATION
Gen: NAD, non-toxic, conversational  Eyes: PERRLA, EOMI   HENT: Normocephalic, atraumatic. External ears normal, left TM with anterior hole but without erythema or discharge, +ttp left posterior ear, +ttp left mastoid, right side nl; no rhinorrhea, dry mucous membranes. +ttp left sided cervical spine, right non-ttp.   CV: RRR, no M/R/G  Resp: CTAB, non-labored, speaking without difficulty on room air  Abd: soft, non tender, non rigid, no guarding or rebound tenderness  Back: No CVAT bilaterally, no midline ttp  Skin: dry, wwp   Neuro: AOx3, speech is fluent and appropriate  Psych: Mood concerned, affect euthymic

## 2018-09-30 NOTE — ED PROVIDER NOTE - ATTENDING CONTRIBUTION TO CARE
I, Jostin Andrade MD, personally saw the patient with the resident, and completed the key components of the history and physical exam. I then discussed the management plan with the resident.    pt w/ worsening L sided neck pain and fever @ home (afebrile in ED).  No nuchal rigidity, point TTP L paraspinal.  Low suspicion for meningitis.  TTP mastoid, concern for mastoiditis given TM rupture.  Will get CT/labs and reeval.

## 2018-09-30 NOTE — ED PROVIDER NOTE - PLAN OF CARE
Take acetaminophen (Tylenol) 975mg (3 regular strength tablets or 2 extra strength tablets) as often as every 6 hours as needed for pain. Never take more than 4000mg of acetaminophen/Tylenol in any 24 hour period. Be cautious of over the counter medications as many formularies contain acetaminophen in them. Take ibuprofen (or Motrin) 600mg (3 tablets) up to 4 times per day as needed for pain with food or milk. Follow up with your primary doctor for repeat evaluation in 3-5 days for repeat evaluation.

## 2018-09-30 NOTE — ED ADULT TRIAGE NOTE - CHIEF COMPLAINT QUOTE
pt c/o stiff neck, abd pain, diarrhea, fever with blood in stool since thursday. pmhx mitral valve prolapse, gerd, migraines, asthma

## 2018-09-30 NOTE — ED PROVIDER NOTE - PROGRESS NOTE DETAILS
Pt reassessed feeling significantly improved, discussed poss discharge and follow up plan, patient comfortable with outpatient follow up and use of apap/ibu outpt, return precautions advised, patient verbalized understanding. Pt stable for discharg.FLORENCIA Nice PGY2

## 2018-09-30 NOTE — ED PROVIDER NOTE - MEDICAL DECISION MAKING DETAILS
57F with multiple medical complaints, primary concerns related to left ear perforation and potential worsening infection, on exam does have left tm perf and ttp along the mastoid concerning for mastoiditis, will eval with labs, imaging, follow up studies, reassess, dispo.

## 2018-10-23 ENCOUNTER — EMERGENCY (EMERGENCY)
Facility: HOSPITAL | Age: 58
LOS: 1 days | Discharge: HOME | End: 2018-10-23
Attending: EMERGENCY MEDICINE | Admitting: EMERGENCY MEDICINE
Payer: MEDICARE

## 2018-10-23 VITALS
HEART RATE: 63 BPM | TEMPERATURE: 98 F | RESPIRATION RATE: 18 BRPM | DIASTOLIC BLOOD PRESSURE: 67 MMHG | OXYGEN SATURATION: 100 % | SYSTOLIC BLOOD PRESSURE: 118 MMHG

## 2018-10-23 DIAGNOSIS — E65 LOCALIZED ADIPOSITY: Chronic | ICD-10-CM

## 2018-10-23 DIAGNOSIS — Z98.51 TUBAL LIGATION STATUS: Chronic | ICD-10-CM

## 2018-10-23 DIAGNOSIS — Z98.89 OTHER SPECIFIED POSTPROCEDURAL STATES: Chronic | ICD-10-CM

## 2018-10-23 DIAGNOSIS — N20.1 CALCULUS OF URETER: Chronic | ICD-10-CM

## 2018-10-23 DIAGNOSIS — Z90.710 ACQUIRED ABSENCE OF BOTH CERVIX AND UTERUS: Chronic | ICD-10-CM

## 2018-10-23 PROCEDURE — 99283 EMERGENCY DEPT VISIT LOW MDM: CPT | Mod: GC

## 2018-10-23 PROCEDURE — 73564 X-RAY EXAM KNEE 4 OR MORE: CPT | Mod: 26,RT

## 2018-10-23 RX ORDER — ACETAMINOPHEN 500 MG
650 TABLET ORAL ONCE
Qty: 0 | Refills: 0 | Status: COMPLETED | OUTPATIENT
Start: 2018-10-23 | End: 2018-10-23

## 2018-10-23 RX ORDER — IBUPROFEN 200 MG
600 TABLET ORAL ONCE
Qty: 0 | Refills: 0 | Status: COMPLETED | OUTPATIENT
Start: 2018-10-23 | End: 2018-10-23

## 2018-10-23 RX ADMIN — Medication 600 MILLIGRAM(S): at 13:33

## 2018-10-23 RX ADMIN — Medication 650 MILLIGRAM(S): at 13:33

## 2018-10-23 NOTE — ED ADULT NURSE REASSESSMENT NOTE - NS ED NURSE REASSESS COMMENT FT1
Pt treated and released by MD Archuleta with instruction.  Ambulating stable with cane.  Left stable.

## 2018-10-23 NOTE — ED PROVIDER NOTE - PHYSICAL EXAMINATION
well appearing,   MSK: rt knee: well appearing,   MSK: rt knee: no effusion, ROM full, no swelling of the knee, no deformity, pulses intact b/l

## 2018-10-23 NOTE — ED PROVIDER NOTE - OBJECTIVE STATEMENT
57 YO F with significant PMH (SEE BELOW) presents to ED c/o rt knee pain x 2weeks. The pain radiates to the foot and is beginning to feel numb. Pt describes the pain as a shooting pain, 9/10. Pt states if she turns the foot the pain is relieved somewhat. Denies falls or trauma. Pt has not taken any medication for pain. Has not seen a orthopedist for this.  for this pain. PSHx on left knee for torn meniscus in 2009. Pt is on social security and disability. NKDA. 57 YO F with significant PMH (SEE BELOW) presents to ED c/o rt knee pain x 2weeks. The pain radiates to the foot and is beginning to feel numb. Pt describes the pain as a shooting pain, 9/10. Pt states if she turns the foot the pain is relieved somewhat. Denies falls or trauma. Pt has not taken any medication for pain. Has not seen a orthopedist for this. Ambulates with a cane. PSHx on left knee for torn meniscus in 2009. Pt is on social security and disability. NKDA.

## 2018-12-01 PROCEDURE — G9005: CPT

## 2018-12-02 ENCOUNTER — EMERGENCY (EMERGENCY)
Facility: HOSPITAL | Age: 58
LOS: 1 days | Discharge: HOME | End: 2018-12-02
Attending: EMERGENCY MEDICINE | Admitting: EMERGENCY MEDICINE
Payer: MEDICARE

## 2018-12-02 VITALS
DIASTOLIC BLOOD PRESSURE: 105 MMHG | OXYGEN SATURATION: 98 % | RESPIRATION RATE: 18 BRPM | TEMPERATURE: 98 F | SYSTOLIC BLOOD PRESSURE: 144 MMHG | HEART RATE: 62 BPM

## 2018-12-02 DIAGNOSIS — Z90.710 ACQUIRED ABSENCE OF BOTH CERVIX AND UTERUS: Chronic | ICD-10-CM

## 2018-12-02 DIAGNOSIS — N20.1 CALCULUS OF URETER: Chronic | ICD-10-CM

## 2018-12-02 DIAGNOSIS — Z98.89 OTHER SPECIFIED POSTPROCEDURAL STATES: Chronic | ICD-10-CM

## 2018-12-02 DIAGNOSIS — E65 LOCALIZED ADIPOSITY: Chronic | ICD-10-CM

## 2018-12-02 DIAGNOSIS — Z98.51 TUBAL LIGATION STATUS: Chronic | ICD-10-CM

## 2018-12-02 LAB
BASE EXCESS BLDV CALC-SCNC: 2.4 MMOL/L — SIGNIFICANT CHANGE UP
BASOPHILS # BLD AUTO: 0.03 K/UL — SIGNIFICANT CHANGE UP (ref 0–0.2)
BASOPHILS NFR BLD AUTO: 0.5 % — SIGNIFICANT CHANGE UP (ref 0–2)
BLOOD GAS VENOUS - CREATININE: 0.75 MG/DL — SIGNIFICANT CHANGE UP (ref 0.5–1.3)
CHLORIDE BLDV-SCNC: 111 MMOL/L — HIGH (ref 96–108)
EOSINOPHIL # BLD AUTO: 0.09 K/UL — SIGNIFICANT CHANGE UP (ref 0–0.5)
EOSINOPHIL NFR BLD AUTO: 1.5 % — SIGNIFICANT CHANGE UP (ref 0–6)
GAS PNL BLDV: 139 MMOL/L — SIGNIFICANT CHANGE UP (ref 136–146)
GLUCOSE BLDV-MCNC: 105 — HIGH (ref 70–99)
HCO3 BLDV-SCNC: 25 MMOL/L — SIGNIFICANT CHANGE UP (ref 20–27)
HCT VFR BLD CALC: 40 % — SIGNIFICANT CHANGE UP (ref 34.5–45)
HCT VFR BLDV CALC: 39.4 % — SIGNIFICANT CHANGE UP (ref 34.5–45)
HGB BLD-MCNC: 12.7 G/DL — SIGNIFICANT CHANGE UP (ref 11.5–15.5)
HGB BLDV-MCNC: 12.8 G/DL — SIGNIFICANT CHANGE UP (ref 11.5–15.5)
IMM GRANULOCYTES # BLD AUTO: 0.01 # — SIGNIFICANT CHANGE UP
IMM GRANULOCYTES NFR BLD AUTO: 0.2 % — SIGNIFICANT CHANGE UP (ref 0–1.5)
LACTATE BLDV-MCNC: 1 MMOL/L — SIGNIFICANT CHANGE UP (ref 0.5–2)
LYMPHOCYTES # BLD AUTO: 2.89 K/UL — SIGNIFICANT CHANGE UP (ref 1–3.3)
LYMPHOCYTES # BLD AUTO: 48.4 % — HIGH (ref 13–44)
MCHC RBC-ENTMCNC: 27.8 PG — SIGNIFICANT CHANGE UP (ref 27–34)
MCHC RBC-ENTMCNC: 31.8 % — LOW (ref 32–36)
MCV RBC AUTO: 87.5 FL — SIGNIFICANT CHANGE UP (ref 80–100)
MONOCYTES # BLD AUTO: 0.57 K/UL — SIGNIFICANT CHANGE UP (ref 0–0.9)
MONOCYTES NFR BLD AUTO: 9.5 % — SIGNIFICANT CHANGE UP (ref 2–14)
NEUTROPHILS # BLD AUTO: 2.38 K/UL — SIGNIFICANT CHANGE UP (ref 1.8–7.4)
NEUTROPHILS NFR BLD AUTO: 39.9 % — LOW (ref 43–77)
NRBC # FLD: 0 — SIGNIFICANT CHANGE UP
PCO2 BLDV: 49 MMHG — SIGNIFICANT CHANGE UP (ref 41–51)
PH BLDV: 7.36 PH — SIGNIFICANT CHANGE UP (ref 7.32–7.43)
PLATELET # BLD AUTO: 290 K/UL — SIGNIFICANT CHANGE UP (ref 150–400)
PMV BLD: 10.9 FL — SIGNIFICANT CHANGE UP (ref 7–13)
PO2 BLDV: 32 MMHG — LOW (ref 35–40)
POTASSIUM BLDV-SCNC: 4 MMOL/L — SIGNIFICANT CHANGE UP (ref 3.4–4.5)
RBC # BLD: 4.57 M/UL — SIGNIFICANT CHANGE UP (ref 3.8–5.2)
RBC # FLD: 13.1 % — SIGNIFICANT CHANGE UP (ref 10.3–14.5)
SAO2 % BLDV: 51.8 % — LOW (ref 60–85)
WBC # BLD: 5.97 K/UL — SIGNIFICANT CHANGE UP (ref 3.8–10.5)
WBC # FLD AUTO: 5.97 K/UL — SIGNIFICANT CHANGE UP (ref 3.8–10.5)

## 2018-12-02 PROCEDURE — 93010 ELECTROCARDIOGRAM REPORT: CPT

## 2018-12-02 PROCEDURE — 99284 EMERGENCY DEPT VISIT MOD MDM: CPT | Mod: 25,GC

## 2018-12-02 RX ORDER — ACETAMINOPHEN 500 MG
975 TABLET ORAL ONCE
Qty: 0 | Refills: 0 | Status: COMPLETED | OUTPATIENT
Start: 2018-12-02 | End: 2018-12-02

## 2018-12-02 RX ORDER — ONDANSETRON 8 MG/1
4 TABLET, FILM COATED ORAL ONCE
Qty: 0 | Refills: 0 | Status: COMPLETED | OUTPATIENT
Start: 2018-12-02 | End: 2018-12-02

## 2018-12-02 RX ORDER — SODIUM CHLORIDE 9 MG/ML
1000 INJECTION INTRAMUSCULAR; INTRAVENOUS; SUBCUTANEOUS ONCE
Qty: 0 | Refills: 0 | Status: COMPLETED | OUTPATIENT
Start: 2018-12-02 | End: 2018-12-02

## 2018-12-02 NOTE — ED PROVIDER NOTE - OBJECTIVE STATEMENT
58F w/ HTN, MVP, gastric bypass hx, p/w dizziness/ nausea and left sided neck pain/ upper scapular pain that pt had similarly one month ago for which she was seen and referred for ortho f/u, now w/ left arm numbness and sharp pains shooting down the left arm, and with palpation of the posterior left occiput/ neck region- she developes dizzines, nausea and vomiting. Also notes a tension like headache that is unlike her usual migraines (which present w/ photosensitivity and unilateral headache)

## 2018-12-02 NOTE — ED PROVIDER NOTE - PROGRESS NOTE DETAILS
pt no distress. pending ct angio head/neck and re assessment. Endorsed to Dr Sauceda Ed att: Pt feels better but with the pain meds and lidocaine patch given. Will dc with the same meds and will recommend that pt follow with outpt ortho. pt says she had an appt with an ortho but the clinic appt was cancelled and so she came to the ED instead.

## 2018-12-02 NOTE — ED PROVIDER NOTE - PHYSICAL EXAMINATION
Gen: AAOx3, NAD   Head: NCAT  ENT: Airway patent, moist mucous membranes, nasal passageways clear, no pharyngeal erythema or exudates, uvula midline, no cervical lymphadenopathy, no carotid bruit, b/l PERRLA, no nystagmus   Cardiac: Normal rate, normal rhythm, no murmurs/rubs/gallops appreciated  Respiratory: Lungs CTA B/L  Gastrointestinal: +BS, Abdomen soft, nontender, nondistended, no rebound, no guarding, no organomegaly   MSK: No gross abnormalities, FROM of all four extremities, no edema  HEME: Extremities warm, pulses intact and symmetrical in all four extremities  Skin: No rashes, no lesions  Neuro: No gross neurologic deficits, CN II-XII intact, no facial asymmetry, no dysarthria, no drift, no dysdiadochokinesia, no dysmetria, strength equal in all four extremities, no gait abnormality, neg rombergs, normal gait Gen: AAOx3, NAD   Head: NCAT  ENT: Airway patent, moist mucous membranes, nasal passageways clear, no pharyngeal erythema or exudates, uvula midline, no cervical lymphadenopathy, no carotid bruit, b/l PERRLA, no nystagmus   Cardiac: Normal rate, normal rhythm, no murmurs/rubs/gallops appreciated  Respiratory: Lungs CTA B/L  Gastrointestinal: +BS, Abdomen soft, nontender, nondistended, no rebound, no guarding, no organomegaly   MSK: No gross abnormalities, FROM of all four extremities, no edema  HEME: Extremities warm, pulses intact and symmetrical in all four extremities  Skin: No rashes, no lesions  Neuro: No gross neurologic deficits, CN II-XII intact, no facial asymmetry, no dysarthria, no drift, no dysdiadochokinesia, no dysmetria, strength equal in all four extremities, no gait abnormality, neg rombergs, normal gait, + sensory deficit in left hand without correlation to any particular nerve distribution

## 2018-12-02 NOTE — ED PROVIDER NOTE - ATTENDING CONTRIBUTION TO CARE
Attending Statement: I have personally seen and examined this patient. I have fully participated in the care of this patient. I have reviewed all pertinent clinical information, including history physical exam, plan and the Resident's note and agree except as noted  57 yo F hx of HTN, mvp, gastric bypass, pw left sided neck pain radiate to the left arm. Described as shooting pain. Feeling dizzy, nausea and NB vomit. no chest pain no sob no fever. no trauma.   hx of migraines, location of headache is different. no numbness/weakness. not on AC  Vital signs noted. sitting up. EOMI. no facial asymetry no slurred speech. supple neck. AOx3, no focal deficits. CN 2-12 grossly intact. nl gait. no meningeal signs. normal S1-S2    No resp distress. able to speak in full and clear sentences. no wheeze, rales or stridor.  plan labs, ekg, ct head/neck angio, pain med

## 2018-12-02 NOTE — ED PROVIDER NOTE - NS ED ROS FT
CONST: no fevers, no chills, weight loss, weakness, appetite changes  EYES: no pain, vision loss/dipoplia/decreased vision  ENT: no sore throat, no cough  CV: no chest pain, no palpitations  RESP: no shortness of breath  ABD: no abdominal pain, no nausea, no vomitng  : no dysuria, increased frequency, or hematuria  MSK: no back pain  NEURO: + headache, + left arm numbness, + neck pain + dizziness, see hpi  HEME: no easy bleeding  SKIN:  no rash

## 2018-12-02 NOTE — ED PROVIDER NOTE - MEDICAL DECISION MAKING DETAILS
58F w/ reported herniated cervical discs w/ left arm numbness and dizziness, assess trop, rule out dissection, possible vertebral artery compression given dizziness only when posterior neck is palpated. CTH/ CTA, pain control, will admin further meds once results are back.

## 2018-12-02 NOTE — ED ADULT TRIAGE NOTE - CHIEF COMPLAINT QUOTE
Pt walk in c/o Neck pain radiates to Left arm, Also reports Nausea Vomiting Headache Dizziness. Sensitive to lights, PMhx MVP, Migraine Raynaud's Kidney stent Denies  Fever Chills CP SOB Seen at [place]  ED but AMA.

## 2018-12-02 NOTE — ED PROVIDER NOTE - NSFOLLOWUPINSTRUCTIONS_ED_ALL_ED_FT
Call your primary care doctor today or tomorrow as well as an orthopedic physician to schedule follow up appointment for within the next 3-5 days.  Continue taking your medications as prescribed.   Return to this Emergency Department if you experience worsening condition or for any other emergency.

## 2018-12-03 VITALS
SYSTOLIC BLOOD PRESSURE: 159 MMHG | RESPIRATION RATE: 18 BRPM | HEART RATE: 62 BPM | OXYGEN SATURATION: 100 % | TEMPERATURE: 98 F | DIASTOLIC BLOOD PRESSURE: 90 MMHG

## 2018-12-03 LAB
ALBUMIN SERPL ELPH-MCNC: 4.4 G/DL — SIGNIFICANT CHANGE UP (ref 3.3–5)
ALP SERPL-CCNC: 79 U/L — SIGNIFICANT CHANGE UP (ref 40–120)
ALT FLD-CCNC: 19 U/L — SIGNIFICANT CHANGE UP (ref 4–33)
APTT BLD: 32.6 SEC — SIGNIFICANT CHANGE UP (ref 27.5–36.3)
AST SERPL-CCNC: 17 U/L — SIGNIFICANT CHANGE UP (ref 4–32)
BILIRUB SERPL-MCNC: 0.7 MG/DL — SIGNIFICANT CHANGE UP (ref 0.2–1.2)
BUN SERPL-MCNC: 16 MG/DL — SIGNIFICANT CHANGE UP (ref 7–23)
CALCIUM SERPL-MCNC: 9.5 MG/DL — SIGNIFICANT CHANGE UP (ref 8.4–10.5)
CHLORIDE SERPL-SCNC: 107 MMOL/L — SIGNIFICANT CHANGE UP (ref 98–107)
CO2 SERPL-SCNC: 24 MMOL/L — SIGNIFICANT CHANGE UP (ref 22–31)
CREAT SERPL-MCNC: 0.89 MG/DL — SIGNIFICANT CHANGE UP (ref 0.5–1.3)
GLUCOSE SERPL-MCNC: 99 MG/DL — SIGNIFICANT CHANGE UP (ref 70–99)
INR BLD: 1.07 — SIGNIFICANT CHANGE UP (ref 0.88–1.17)
POTASSIUM SERPL-MCNC: 4.2 MMOL/L — SIGNIFICANT CHANGE UP (ref 3.5–5.3)
POTASSIUM SERPL-SCNC: 4.2 MMOL/L — SIGNIFICANT CHANGE UP (ref 3.5–5.3)
PROT SERPL-MCNC: 7.4 G/DL — SIGNIFICANT CHANGE UP (ref 6–8.3)
PROTHROM AB SERPL-ACNC: 12.2 SEC — SIGNIFICANT CHANGE UP (ref 9.8–13.1)
SODIUM SERPL-SCNC: 142 MMOL/L — SIGNIFICANT CHANGE UP (ref 135–145)
TROPONIN T, HIGH SENSITIVITY: < 6 NG/L — SIGNIFICANT CHANGE UP (ref ?–14)

## 2018-12-03 PROCEDURE — 70498 CT ANGIOGRAPHY NECK: CPT | Mod: 26

## 2018-12-03 PROCEDURE — 70496 CT ANGIOGRAPHY HEAD: CPT | Mod: 26

## 2018-12-03 RX ORDER — LIDOCAINE 4 G/100G
1 CREAM TOPICAL ONCE
Qty: 0 | Refills: 0 | Status: COMPLETED | OUTPATIENT
Start: 2018-12-03 | End: 2018-12-03

## 2018-12-03 RX ORDER — DIAZEPAM 5 MG
5 TABLET ORAL ONCE
Qty: 0 | Refills: 0 | Status: DISCONTINUED | OUTPATIENT
Start: 2018-12-03 | End: 2018-12-03

## 2018-12-03 RX ORDER — DIAZEPAM 5 MG
1 TABLET ORAL
Qty: 5 | Refills: 0 | OUTPATIENT
Start: 2018-12-03 | End: 2018-12-07

## 2018-12-03 RX ORDER — HYDROCORTISONE 20 MG
125 TABLET ORAL EVERY 8 HOURS
Qty: 0 | Refills: 0 | Status: DISCONTINUED | OUTPATIENT
Start: 2018-12-03 | End: 2018-12-06

## 2018-12-03 RX ORDER — IBUPROFEN 200 MG
600 TABLET ORAL ONCE
Qty: 0 | Refills: 0 | Status: COMPLETED | OUTPATIENT
Start: 2018-12-03 | End: 2018-12-03

## 2018-12-03 RX ORDER — DIPHENHYDRAMINE HCL 50 MG
50 CAPSULE ORAL ONCE
Qty: 0 | Refills: 0 | Status: COMPLETED | OUTPATIENT
Start: 2018-12-03 | End: 2018-12-03

## 2018-12-03 RX ORDER — LIDOCAINE 4 G/100G
1 CREAM TOPICAL
Qty: 1 | Refills: 0 | OUTPATIENT
Start: 2018-12-03 | End: 2018-12-07

## 2018-12-03 RX ORDER — IBUPROFEN 200 MG
1 TABLET ORAL
Qty: 84 | Refills: 0 | OUTPATIENT
Start: 2018-12-03 | End: 2018-12-23

## 2018-12-03 RX ORDER — FAMOTIDINE 10 MG/ML
20 INJECTION INTRAVENOUS ONCE
Qty: 0 | Refills: 0 | Status: COMPLETED | OUTPATIENT
Start: 2018-12-03 | End: 2018-12-03

## 2018-12-03 RX ADMIN — SODIUM CHLORIDE 1000 MILLILITER(S): 9 INJECTION INTRAMUSCULAR; INTRAVENOUS; SUBCUTANEOUS at 00:16

## 2018-12-03 RX ADMIN — Medication 600 MILLIGRAM(S): at 03:18

## 2018-12-03 RX ADMIN — ONDANSETRON 4 MILLIGRAM(S): 8 TABLET, FILM COATED ORAL at 00:15

## 2018-12-03 RX ADMIN — LIDOCAINE 1 PATCH: 4 CREAM TOPICAL at 02:40

## 2018-12-03 RX ADMIN — Medication 125 MILLIGRAM(S): at 02:09

## 2018-12-03 RX ADMIN — FAMOTIDINE 20 MILLIGRAM(S): 10 INJECTION INTRAVENOUS at 02:01

## 2018-12-03 RX ADMIN — Medication 5 MILLIGRAM(S): at 03:18

## 2018-12-03 RX ADMIN — Medication 975 MILLIGRAM(S): at 00:15

## 2018-12-03 RX ADMIN — Medication 50 MILLIGRAM(S): at 02:01

## 2018-12-03 NOTE — ED ADULT NURSE NOTE - OBJECTIVE STATEMENT
pt a&o x3 c/o left hand numbness since yesterday. numbness is contained in left hand. endorses n/v, lightheadedness and chest pain. no motor defecit, drift, slurred speech, facial asymmetry or balance instabilities noted. md assessed. pt also denying trauma/ injury to extremity. vss. right ac 20g placed. nad noted. will monitor, endorsed to primary rn elda

## 2018-12-03 NOTE — ED ADULT NURSE NOTE - CHIEF COMPLAINT QUOTE
Pt walk in c/o Neck pain radiates to Left arm, Also reports Nausea Vomiting Headache Dizziness. Sensitive to lights, PMhx MVP, Migraine Raynaud's Kidney stent Denies  Fever Chills CP SOB Seen at [place]  ED but AMA.

## 2018-12-03 NOTE — ED ADULT NURSE NOTE - NSIMPLEMENTINTERV_GEN_ALL_ED
Implemented All Universal Safety Interventions:  [place] to call system. Call bell, personal items and telephone within reach. Instruct patient to call for assistance. Room bathroom lighting operational. Non-slip footwear when patient is off stretcher. Physically safe environment: no spills, clutter or unnecessary equipment. Stretcher in lowest position, wheels locked, appropriate side rails in place.

## 2019-04-27 NOTE — ED ADULT TRIAGE NOTE - CHIEF COMPLAINT QUOTE
Co left flank pain since last night. States pain subsided but started again today. Co nausea, vomiting and loose stools. Hx of lithotripsy.
No

## 2019-10-14 NOTE — ED ADULT TRIAGE NOTE - PRO INTERPRETER NEED 2
Medication request: gabapentin 300mg Oral Cap, #30, no refills  Take 1 capsule by mouth nightly.      [place]: [place]17/19    LOV: 6/6/19  NOV: Not yet scheduled - Continue gabapentin at night time dose only English

## 2024-02-01 NOTE — ED CDU PROVIDER INITIAL DAY NOTE - OBJECTIVE STATEMENT
56 yo woman pmhx of ? eczema vs psoriatic arthritis (no immune modulators has had extensive work ups and hasn't been told what she has ) here with R sided facial itching and swelling since monday. Patient developed her typical looking lesion on her R cheek and then developed itching and drainage from ther site ( the typical progression of her lesions). Today came in for continued swelling and pain.  Denies fevers, chills, blurry vision, headache n/v/. In the ED normal wbc, afebrile. Sent to CDU for IV Abx. Luther Gaming MD

## 2024-12-10 NOTE — ED ADULT NURSE NOTE - NSHISCREENINGQ1_ED_A_ED
ED SBAR report provider to 1 W floor, RN. Patient to be transported to Room 1305 via stretcher by RN.Patient transported with bedside cardiac monitor and with IV medications infusing. IV site clean, dry, and intact. MEWS score and pain assessed as 3/10 and documented. Updated patient on plan of care.   
Pt placed on zole with pads for precautionary measures due to low HR  
Pt to echo at this time  
Pts O2 level has varied between 90-94%, This RN tried to placed pt on O2 for support. Pt denied Rns request and does not want to be placed on O2. Pt states \" I can breathe\".   
No

## 2025-01-14 NOTE — ED PROVIDER NOTE - INTERPRETATION
Detail Level: Detailed Depth Of Biopsy: dermis normal Was A Bandage Applied: Yes Size Of Lesion In Cm: 0.3 Biopsy Type: H and E Biopsy Method: Dermablade Anesthesia Type: 1% lidocaine with epinephrine Anesthesia Volume In Cc: 0.5 Additional Anesthesia Volume In Cc (Will Not Render If 0): 0 Hemostasis: Renetta's Wound Care: Petrolatum Dressing: bandage Destruction After The Procedure: No Type Of Destruction Used: Curettage Curettage Text: The wound bed was treated with curettage after the biopsy was performed. Cryotherapy Text: The wound bed was treated with cryotherapy after the biopsy was performed. Electrodesiccation Text: The wound bed was treated with electrodesiccation after the biopsy was performed. Electrodesiccation And Curettage Text: The wound bed was treated with electrodesiccation and curettage after the biopsy was performed. Silver Nitrate Text: The wound bed was treated with silver nitrate after the biopsy was performed. Lab: -100 Medical Necessity Information: It is in your best interest to select a reason for this procedure from the list below. All of these items fulfill various CMS LCD requirements except the new and changing color options. Consent: Written consent was obtained and risks were reviewed including but not limited to scarring, infection, bleeding, scabbing, incomplete removal, nerve damage and allergy to anesthesia. Post-Care Instructions: I reviewed with the patient in detail post-care instructions. Patient is to keep the biopsy site dry overnight, and then apply Vaseline to the area twice daily. Clean area with soap and water twice daily. Notification Instructions: Patient will be notified of biopsy results. However, patient instructed to call the office if not contacted within 2 weeks. Billing Type: Third-Party Bill Information: Selecting Yes will display possible errors in your note based on the variables you have selected. This validation is only offered as a suggestion for you. PLEASE NOTE THAT THE VALIDATION TEXT WILL BE REMOVED WHEN YOU FINALIZE YOUR NOTE. IF YOU WANT TO FAX A PRELIMINARY NOTE YOU WILL NEED TO TOGGLE THIS TO 'NO' IF YOU DO NOT WANT IT IN YOUR FAXED NOTE.
